# Patient Record
Sex: MALE | Race: WHITE | URBAN - METROPOLITAN AREA
[De-identification: names, ages, dates, MRNs, and addresses within clinical notes are randomized per-mention and may not be internally consistent; named-entity substitution may affect disease eponyms.]

---

## 2017-05-24 ENCOUNTER — TELEPHONE (OUTPATIENT)
Dept: OTHER | Facility: OUTPATIENT CENTER | Age: 42
End: 2017-05-24

## 2017-05-24 NOTE — TELEPHONE ENCOUNTER
PER [facility] - NO CO-PAY $3.00 MD VISITS, NO IND DEDUCT, NO CO-INS, NO OOPM - AUTH REQ. FOR ALL SERVICES - CALLED IN - NO CSB EXCLUSIONS.

## 2017-05-24 NOTE — TELEPHONE ENCOUNTER
[facility] Telephone Intake    Date:  May 24, 2017  Client Name:  Cole Matt  Preferred Name: Cole      MRN:  [id number]   :  1975       Age:  41 year old     Presenting Problem / Reason for Assessment   (Clinical History &Symptoms):     Pt made call with  on the phone to assist.    Past pt, wants to be seen again for compulsive sexual behavior.     Suggested Program: CSB    Medications:    no    Prescribing Physician:  n/a    Diagnosis (if known): n/a    Referral Source: past pt    Legal:  Court ordered evaluation:   No  Any legal charges pending: no  Is this county ordered:   no    Follow Up:    Insurance Benefits to be evaluated.  Note will be entered when validated.    Patient wishes to be contacted regarding Insurance benefits:  YES    Please Verify Registration  Holding welcome packet at

## 2017-06-05 ENCOUNTER — OFFICE VISIT (OUTPATIENT)
Dept: OTHER | Facility: OUTPATIENT CENTER | Age: 42
End: 2017-06-05

## 2017-06-05 DIAGNOSIS — F65.3 VOYEURISM: ICD-10-CM

## 2017-06-05 DIAGNOSIS — F84.0 AUTISM SPECTRUM DISORDER: ICD-10-CM

## 2017-06-05 DIAGNOSIS — F65.4: Primary | ICD-10-CM

## 2017-06-05 DIAGNOSIS — F43.23 ADJUSTMENT DISORDER WITH MIXED ANXIETY AND DEPRESSED MOOD: ICD-10-CM

## 2017-06-05 ASSESSMENT — ANXIETY QUESTIONNAIRES
GAD7 TOTAL SCORE: 2
3. WORRYING TOO MUCH ABOUT DIFFERENT THINGS: NOT AT ALL
4. TROUBLE RELAXING: NOT AT ALL
6. BECOMING EASILY ANNOYED OR IRRITABLE: NOT AT ALL
5. BEING SO RESTLESS THAT IT IS HARD TO SIT STILL: NOT AT ALL
2. NOT BEING ABLE TO STOP OR CONTROL WORRYING: SEVERAL DAYS
1. FEELING NERVOUS, ANXIOUS, OR ON EDGE: SEVERAL DAYS
7. FEELING AFRAID AS IF SOMETHING AWFUL MIGHT HAPPEN: NOT AT ALL

## 2017-06-05 NOTE — MR AVS SNAPSHOT
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    -  1    Voyeurism        Adjustment disorder with mixed anxiety and depressed mood        Autism spectrum disorder           Follow-ups after your visit        Your next 10 appointments already scheduled     2017  4:00 PM CDT   INDIVIDUAL THERAPY with Rishi Casillas, PhD ANNA   [facility] ([facility])    [address]  Mail [address]  [facility] [address]   [phone]              Who to contact     Please call your clinic at [phone] to:    Ask questions about your health    Make or cancel appointments    Discuss your medicines    Learn about your test results    Speak to your doctor   If you have compliments or concerns about an experience at your clinic, or if you wish to file a complaint, please contact [facility] Patient Relations at [phone] or email us at [email or web address]         Additional Information About Your Visit        MyChart Information     [employer] is an electronic gateway that provides easy, online access to your medical records. With [employer], you can request a clinic appointment, read your test results, renew a prescription or communicate with your care team.     To sign up for [employer] visit the website at [email or web address]   You will be asked to enter the access code listed below, as well as some personal information. Please follow the directions to create your username and password.     Your access code is: [email or web address]  Expires: 9/3/2017  7:45 PM     Your access code will  in 90 days. If you need help or a new code,  please contact your [facility] or call [phone] for assistance.        Care EveryWhere ID     This is your Care EveryWhere ID. This could be used by other organizations to access your [facility] medical records  [id number]         Blood Pressure from Last 3 Encounters:   No data found for BP    Weight from Last 3 Encounters:   No data found for Wt              We Performed the Following     Diagnostic Assessment (complete) [19281]     Mental Health Tx Plan Scan (HIM Scan)        Primary Care Provider    None Doctor, MD       No address on file        Thank you!     Thank you for choosing [facility]  for your care. Our goal is always to provide you with excellent care. Hearing back from our patients is one way we can continue to improve our services. Please take a few minutes to complete the written survey that you may receive in the mail after your visit with us. Thank you!             Your Updated Medication List - Protect others around you: Learn how to safely use, store and throw away your medicines at www.disposemymeds.org.      Notice  As of 6/5/2017  7:45 PM    You have not been prescribed any medications.

## 2017-06-06 ASSESSMENT — PATIENT HEALTH QUESTIONNAIRE - PHQ9: SUM OF ALL RESPONSES TO PHQ QUESTIONS 1-9: 5

## 2017-06-06 ASSESSMENT — ANXIETY QUESTIONNAIRES: GAD7 TOTAL SCORE: 2

## 2017-06-06 NOTE — PROGRESS NOTES
[facility]  [facility]  Department of Family Medicine & Community Health  [facility]  [address]  [place] [address]  Phone: [phone]  Fax: [phone]  [email or web address]      Diagnostic Assessment           Date of Service: 6/05/17  Client Name: Cole Johnson  YOB: 1975  41 year old  MRN:  [id number]  Gender/Gender Identity: Male  Date(s) of Service:    This client was initially seen by Rishi Casillas on 06/05/17 for a diagnostic assessment. The initial interview lasted about 50 minutes.     Other individuals present at session (other than client):   The client came alone. Medical student M.O. was present for training purposes with the client s informed consent.    Description of Process:   50 minute hour; client confidentiality reviewed.     Referral Source:   The client heard about [facility] from his , but he is a past client as well.      CHIEF COMPLAINT/ PRESENTING PROBLEM:    The client stated that he has concerns about sex offending behavior.        History of Presenting Problem/Illness:   Mr. Johnson was sent to this clinic by [facility] ([place] ) because he was following boys into bathrooms. This happened in March 2017. He was doing this at his [employer] and parents noticed his behavior. [employer] staff called the police, who came to the [employer]. The police talked to him and suggested he seek services.     The client stated that he has done this before over a 10 year period. Mr. Johnson stated that he wants to see their feet and shoes, but then he later stated that he likes the idea of the boys going to the bathroom. He would to hear and see them going to the bathroom. Mr. Johnson prefers to witness the boys defecating. He stated that he prefers that the person in question be boys defecating.    He started looking for boys going to the bathroom when he realized he  was attracted to boys going to the bathroom. He stated that he only stopped looking for boys going to the bathroom from 4831-2842. Mr. Johnson stated that he is only interested in boys aged 5-12.    SEXUAL BEHAVIORS/FUNCTIONING:   The client stated that he masturbates 1-3 times per week, usually without any problems. He lasted masturbated 1-2 weeks ago. He has used pictures for 50% of his masturbation in the past, but he stopped in March 2017 when he encountered problems. Mr. Johnson stopped using the pictures of his own accord. He cut the pictures out of clothing magazines. The pictures show boys wearing clothing, including swimsuits. Over time, he would get rid of pictures occasionally. He usually would add new ones. Although he denied using videos for masturbation, he recalled using stories that he wrote about two boys who were friends. The stories would give him an erection.    He denied having any sexual experiences in his life. Mr. Johnson stated that he would like to have a sexual experience with a woman. This would make him feel good because he would like to have a normal relationship. He denied any unwanted sexual experiences in his life. He has sexual attractions to boys and some sexual attraction to adult males and some sexual attraction to women.    MENTAL HEALTH HISTORY:    Mr. Johnson stated that he [facility] to [facility]. Dr. Alpesh Krueger was his therapist. He was kicked out, but he could not recall why. Before this, Mr. Johnson did therapy from 9599-9413 with Dr. Bowers at [facility] in [place], [place]. He was going to school there and was found to have pictures of boys in his room. He had some pictures he took himself and some cut out of magazines.    The client denied any other history of therapy, but he noted seeing a therapist in 2006 who diagnosed him with asperger s. He completed testing for [facility]  while he was trying to receive vocational services.    PHQ-9:  PHQ-9 ([employer]) 6/5/2017   1.  Little interest or pleasure in doing things 1   2.  Feeling down, depressed, or hopeless 1   3.  Trouble falling or staying asleep, or sleeping too much 0   4.  Feeling tired or having little energy 1   5.  Poor appetite or overeating 0   6.  Feeling bad about yourself 1   7.  Trouble concentrating 0   8.  Moving slowly or restless 0   9.  Suicidal or self-harm thoughts 1   PHQ-9 Total Score 5         PHQ-9 SCORING CARE FOR SEVERITY  For health professional use only.     Scoring - add up all selected items on PHQ-9  For every item selected:  Not at all = 0  Several days = 1  More than half the days = 2  Nearly every day = 3      Interpretation of Total Score  Total Score Depression Severity and Recommendations   0-9 No Major Depression   10-14 Moderate.  Initial weekly follow up.  If patient is responding, monthly contacts.  Meds or therapy.   15-19 Moderate severe.  Initial weekly follow up.  If patient is responding, 2-4 week contacts.  Meds and/or therapy.   >20 Severe.  Weekly contact.  Meds and therapy.           DEVENDRA  1. Feeling nervous, anxious, or on edge: Several days  2. Not being able to stop or control worrying: Several days  3. Worrying too much about different things: Not at all  4. Trouble relaxing: Not at all  5. Being so restless that it is hard to sit still: Not at all  6. Becoming easily annoyed or irritable: Not at all  7. Feeling afraid, as if something awful might happen: Not at all    DEVENDRA-7 Total Score: 2    Interpretation:    DEVENDRA-7 total score for the 7 items ranges from 0 - 21.    0 - 5     Minimal anxiety  6 - 10   Mild anxiety  11 - 15 Moderate anxiety  16 - 21 Severe anxiety          MEDICAL HISTORY:    The client denied experiencing any health concerns. He had surgery on his right ear due to an infection. He had a hernia operation in 1996. Mr. Johnson recalled taking Risperdal from 7404-9780  to help him concentrate and lower his sexual urges. He thinks the medication helped with both, but he thinks the medication caused him to be anxious. The client took another medication for depression, but he could not recall what it was.      SUBSTANCE USE:   Mr. Johnson typically consumes three glasses of alcohol per week. He denied smoking tobacco cigarettes. He denied the use or abuse of any other substances.        CAGE  Have you ever felt you should Cut down on your drinking or drug use?: No  Have people Annoyed you by criticizing your drinking or drug use?: No  Have you ever felt bad or Guilty about your drinking or drug use?: No  Have you ever had a drink or used drugs first thing in the morning to steady your nerves or to get rid of a hangover? (Eye opener): No  CAGE-AID SCORE: 0             FAMILY/RELATIONSHIP/SOCIAL HISTORY:      Education/Occupation:  He has been unemployed since the end of May 2017. The client was recently laid off from [employer] because the store was closing. 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 Mr. 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 The client has a BA from [employer]. He had a 2.0 GPA.    Family History:  The client grew up with a mother, father, and one older sister in [place]. His upbringing was fine. He had trouble describing his family. His father was somewhat introverted. He was not sure who he was close to growing up, but later speculated it may have been his mother. Mr. Johnson lives with his parents currently. He has always lived with them, but he has wanted to become more financially independent. His sister lives in [place], [place] and they talk twice per year; she calls once per month but usually talks to their parents.    Social History:  The client stated that he is unhappy with his social life. He has few friends and would like more. Fred is his closest friend, a man he  has known for over 20 years. Fred lives in [place] and they do not talk much.  He enjoys reading, listening to music, and watching sports.     RELATIONSHIP HISTORY:  Mr. Johnson is single and has never been . He has never been in a relationship in the past and denied dating. He likes the idea of dating a woman, but thinks they might break-up because she dislikes his personality. He described himself as timid. He thinks it would help if the woman was outgoing.    LEGAL ISSUES:   The client denied having any legal problems and has never been arrested. He reported that in 2004 he was doing the same bathroom behaviors at a [employer] field and the police talked to him. In 2011 the police talked to him for loitering at a [employer]; he was there looking for boys going to the bathroom.    STRENGTHS AND LIABILITIES:   Mr. Johnson seems somewhat motivated and has used therapy in the past. He stated a desire to do individual therapy once per month. It is not clear why he has not seen a therapist for his sexual concerns or his autism spectrum disorder in the last several years. Like many persons, he may find it challenging at times to explore himself emotionally, psychologically, and sexually.     MENTAL STATUS:    A formal mental status exam was not performed during this interview, but Mr. Johnson appeared to be adequately alert and oriented in all spheres. He was casually dressed. He showed no unusual motor activity. His eye contact was adequate. He appeared to have a sufficient fund of knowledge (e.g. regarding current events). His recent and remote memory skills were slightly below average. He has some trouble providing details. His thinking and concentration were good. The rate, volume and tone of his speech were essentially normal. He seemed mildly anxious and somewhat dysphoric, but otherwise his overall affect was euthymic.  His mood was congruent.  Insight and judgment appeared to be below average.        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 Johnson is a 41 year old  male who reports a history of sexual attraction to boys 5-12 years old and voyeurism of such boys going to the bathroom. Per his self-report, DEVENDRA-7 score, and PHQ-9 score, he is also experiencing some anxiety and depression. Because these negative mood states are probably the result of recent stressors (e.g., unemployment; poor social support; nearly arrested), he is being diagnosed with an Adjustment Disorder with mixed anxiety and depression. Mr. Johnson has an Autism Spectrum Disorder but has not received therapy for this. He may be engaging in voyeurism because he is seeking a sexual outlet. He has not adequately learned how to cope with his sexual interests so that he keeps himself safe and does not violate others. It is recommended that Mr. Johnson participate in sex offender therapy to help resolve his sexual concerns. It is also recommended that he receive counseling that focuses on his Autism Spectrum Disorder. This writer s clinic typically cannot serve the complex needs of clients who have both sexual boundary violations and austim. Individual therapy and any possible group therapy could be explored at [facility], a facility that specializes in working with such individuals. This writer provided Mr. Johnson with this recommendation and referral as well as three other options for sex offender treatment.        ___________________________________  Rishi Casillas, Ph.D.              Date  Licensed Psychologist